# Patient Record
Sex: FEMALE | Race: ASIAN | NOT HISPANIC OR LATINO | URBAN - METROPOLITAN AREA
[De-identification: names, ages, dates, MRNs, and addresses within clinical notes are randomized per-mention and may not be internally consistent; named-entity substitution may affect disease eponyms.]

---

## 2017-06-12 VITALS
HEART RATE: 75 BPM | TEMPERATURE: 99 F | HEIGHT: 62 IN | DIASTOLIC BLOOD PRESSURE: 61 MMHG | OXYGEN SATURATION: 95 % | SYSTOLIC BLOOD PRESSURE: 119 MMHG | RESPIRATION RATE: 18 BRPM | WEIGHT: 158.73 LBS

## 2017-06-13 ENCOUNTER — INPATIENT (INPATIENT)
Facility: HOSPITAL | Age: 62
LOS: 1 days | Discharge: HOME | DRG: 30 | End: 2017-06-15
Attending: ORTHOPAEDIC SURGERY | Admitting: ORTHOPAEDIC SURGERY
Payer: COMMERCIAL

## 2017-06-13 DIAGNOSIS — Z98.891 HISTORY OF UTERINE SCAR FROM PREVIOUS SURGERY: Chronic | ICD-10-CM

## 2017-06-13 DIAGNOSIS — Z98.890 OTHER SPECIFIED POSTPROCEDURAL STATES: Chronic | ICD-10-CM

## 2017-06-13 DIAGNOSIS — M54.12 RADICULOPATHY, CERVICAL REGION: ICD-10-CM

## 2017-06-13 LAB
APTT BLD: 33.4 SEC — SIGNIFICANT CHANGE UP (ref 27.5–37.4)
INR BLD: 0.9 — SIGNIFICANT CHANGE UP (ref 0.88–1.16)
PROTHROM AB SERPL-ACNC: 10 SEC — SIGNIFICANT CHANGE UP (ref 9.8–12.7)

## 2017-06-13 RX ORDER — FLUCONAZOLE 150 MG/1
100 TABLET ORAL DAILY
Qty: 0 | Refills: 0 | Status: DISCONTINUED | OUTPATIENT
Start: 2017-06-13 | End: 2017-06-15

## 2017-06-13 RX ORDER — ONDANSETRON 8 MG/1
4 TABLET, FILM COATED ORAL EVERY 6 HOURS
Qty: 0 | Refills: 0 | Status: DISCONTINUED | OUTPATIENT
Start: 2017-06-13 | End: 2017-06-15

## 2017-06-13 RX ORDER — ACETAMINOPHEN 500 MG
650 TABLET ORAL EVERY 6 HOURS
Qty: 0 | Refills: 0 | Status: DISCONTINUED | OUTPATIENT
Start: 2017-06-13 | End: 2017-06-15

## 2017-06-13 RX ORDER — CEFAZOLIN SODIUM 1 G
2000 VIAL (EA) INJECTION EVERY 8 HOURS
Qty: 0 | Refills: 0 | Status: DISCONTINUED | OUTPATIENT
Start: 2017-06-13 | End: 2017-06-13

## 2017-06-13 RX ORDER — SIMVASTATIN 20 MG/1
1 TABLET, FILM COATED ORAL
Qty: 0 | Refills: 0 | COMMUNITY

## 2017-06-13 RX ORDER — SENNA PLUS 8.6 MG/1
2 TABLET ORAL AT BEDTIME
Qty: 0 | Refills: 0 | Status: DISCONTINUED | OUTPATIENT
Start: 2017-06-13 | End: 2017-06-15

## 2017-06-13 RX ORDER — SODIUM CHLORIDE 9 MG/ML
1000 INJECTION, SOLUTION INTRAVENOUS
Qty: 0 | Refills: 0 | Status: DISCONTINUED | OUTPATIENT
Start: 2017-06-13 | End: 2017-06-15

## 2017-06-13 RX ORDER — SIMVASTATIN 20 MG/1
10 TABLET, FILM COATED ORAL AT BEDTIME
Qty: 0 | Refills: 0 | Status: DISCONTINUED | OUTPATIENT
Start: 2017-06-13 | End: 2017-06-15

## 2017-06-13 RX ORDER — MORPHINE SULFATE 50 MG/1
4 CAPSULE, EXTENDED RELEASE ORAL
Qty: 0 | Refills: 0 | Status: DISCONTINUED | OUTPATIENT
Start: 2017-06-13 | End: 2017-06-14

## 2017-06-13 RX ORDER — MORPHINE SULFATE 50 MG/1
4 CAPSULE, EXTENDED RELEASE ORAL
Qty: 0 | Refills: 0 | Status: DISCONTINUED | OUTPATIENT
Start: 2017-06-13 | End: 2017-06-13

## 2017-06-13 RX ORDER — FLUCONAZOLE 150 MG/1
1 TABLET ORAL
Qty: 0 | Refills: 0 | COMMUNITY

## 2017-06-13 RX ORDER — CEFAZOLIN SODIUM 1 G
2000 VIAL (EA) INJECTION EVERY 8 HOURS
Qty: 0 | Refills: 0 | Status: COMPLETED | OUTPATIENT
Start: 2017-06-13 | End: 2017-06-14

## 2017-06-13 RX ORDER — DEXAMETHASONE 0.5 MG/5ML
10 ELIXIR ORAL EVERY 8 HOURS
Qty: 0 | Refills: 0 | Status: COMPLETED | OUTPATIENT
Start: 2017-06-13 | End: 2017-06-14

## 2017-06-13 RX ADMIN — MORPHINE SULFATE 4 MILLIGRAM(S): 50 CAPSULE, EXTENDED RELEASE ORAL at 18:22

## 2017-06-13 RX ADMIN — MORPHINE SULFATE 4 MILLIGRAM(S): 50 CAPSULE, EXTENDED RELEASE ORAL at 19:16

## 2017-06-13 RX ADMIN — Medication 102 MILLIGRAM(S): at 21:00

## 2017-06-13 RX ADMIN — MORPHINE SULFATE 4 MILLIGRAM(S): 50 CAPSULE, EXTENDED RELEASE ORAL at 19:35

## 2017-06-13 RX ADMIN — MORPHINE SULFATE 4 MILLIGRAM(S): 50 CAPSULE, EXTENDED RELEASE ORAL at 18:45

## 2017-06-13 RX ADMIN — SODIUM CHLORIDE 120 MILLILITER(S): 9 INJECTION, SOLUTION INTRAVENOUS at 20:28

## 2017-06-13 RX ADMIN — Medication 100 MILLIGRAM(S): at 21:34

## 2017-06-13 RX ADMIN — MORPHINE SULFATE 4 MILLIGRAM(S): 50 CAPSULE, EXTENDED RELEASE ORAL at 17:44

## 2017-06-13 NOTE — DISCHARGE NOTE ADULT - NS AS ACTIVITY OBS
Stairs allowed/Walking-Outdoors allowed/Do not drive or operate machinery/No Heavy lifting/straining/Walking-Indoors allowed

## 2017-06-13 NOTE — DISCHARGE NOTE ADULT - MEDICATION SUMMARY - MEDICATIONS TO TAKE
I will START or STAY ON the medications listed below when I get home from the hospital:    acetaminophen-oxycodone 325 mg-5 mg oral tablet  -- 1 tab(s) by mouth every 4 hours, As needed, Moderate Pain  -- Indication: For pain    acetaminophen-oxycodone 325 mg-5 mg oral tablet  -- 2 tab(s) by mouth every 4 hours, As Needed -Severe Pain  -- Indication: For pain    fluconazole 100 mg oral tablet  -- 1 tab(s) by mouth once a day  -- Indication: For Home med    Zocor 10 mg oral tablet  -- 1 tab(s) by mouth once a day (at bedtime)  -- Indication: For Home med I will START or STAY ON the medications listed below when I get home from the hospital:    acetaminophen-oxycodone 325 mg-5 mg oral tablet  -- 1 to 2 tab(s) by mouth every 4 hours, As Needed MDD:eight  -- Caution federal law prohibits the transfer of this drug to any person other  than the person for whom it was prescribed.  May cause drowsiness.  Alcohol may intensify this effect.  Use care when operating dangerous machinery.  This prescription cannot be refilled.  This product contains acetaminophen.  Do not use  with any other product containing acetaminophen to prevent possible liver damage.  Using more of this medication than prescribed may cause serious breathing problems.    -- Indication: For Pain    fluconazole 100 mg oral tablet  -- 1 tab(s) by mouth once a day  -- Indication: For Home med    Zocor 10 mg oral tablet  -- 1 tab(s) by mouth once a day (at bedtime)  -- Indication: For Home med

## 2017-06-13 NOTE — DISCHARGE NOTE ADULT - PATIENT PORTAL LINK FT
“You can access the [employer] Patient Portal, offered by [facility], by registering with the following website: [email or web address]”

## 2017-06-13 NOTE — H&P ADULT - NSHPPHYSICALEXAM_GEN_ALL_CORE
MSK:  +Decreased ROM secondary to pain, cervical spine    Remainder of physical exam per medical clearance note. MSK:  +Decreased ROM secondary to pain, cervical spine  Delt/bicep/tricep 5/5 strength bilateral UE. Wrist flex/ext intact. Motor intact to AIN/PIN/ulna.  strength intact bilateral UE.  Diminished sensation to light touch right hand. Sensation intact distal left upper extremity.  Radial pulses palpable, fingers warm and well perfused, cap refill brisk.    Remainder of physical exam per medical clearance note.

## 2017-06-13 NOTE — DISCHARGE NOTE ADULT - HOSPITAL COURSE
Admitted  Surgery -  Perioperative abx  Pain control  DVT ppx Admitted  Surgery - ACDF  Perioperative abx  Pain control  DVT ppx Admitted  Surgery - ACDF C4-7  Perioperative abx  Pain control  DVT ppx

## 2017-06-13 NOTE — H&P ADULT - PROBLEM SELECTOR PLAN 1
Admit to Orthopaedic Service.  Presents today for elective ACDF C4-C7  Pt medically stable and cleared for procedure today by Admit to Orthopaedic Service.  Presents today for elective ACDF C4-C7  Pt medically stable and cleared for procedure today by Dr. KIMBERLYN Todd

## 2017-06-13 NOTE — DISCHARGE NOTE ADULT - CARE PLAN
Principal Discharge DX:	Cervical radiculopathy  Goal:	improvement after surgery  Instructions for follow-up, activity and diet:	see below Principal Discharge DX:	Cervical radiculopathy  Goal:	improvement after surgery ACDF C4-7  Instructions for follow-up, activity and diet:	see below

## 2017-06-13 NOTE — DISCHARGE NOTE ADULT - ADDITIONAL INSTRUCTIONS
No strenuous activity, bending, twisting, heavy lifting, driving, tub bathing, or returning to work until cleared by MD.  You may sponge bathe.    Change dressing daily for 5 days from surgery.  Keep dressing clean and dry.  Remove dressing after post op day 5, then leave incision open to air.  You may now shower.    Follow up with Dr. Todd in his office in 2 weeks from surgery.  Any staples/sutures will be removed in 2 weeks.    If you don't have a bowel movement by post op day 3, then take Milk of Magnesia (over the counter).  If no bowel movement by at least post op day 5, then use a Dulcolax suppository (over the counter) and/or a Fleets enema--if still no bowel movement, call your MD.  Contact your doctor if you experience: fever greater than 101.5, chills, chest pain, difficulty breathing, bleeding, redness or heat around the incision.    Please follow up with your primary care provider. Restart aspirin in 5 days.  No strenuous activity, bending, twisting, heavy lifting, driving, tub bathing, or returning to work until cleared by MD.  You may sponge bathe.    Change dressing daily for 5 days from surgery.  Keep dressing clean and dry.  Remove dressing after post op day 5, then leave incision open to air.  You may then shower.    Follow up with Dr. Todd in his office in 2 weeks from surgery.  Any staples/sutures will be removed in 2 weeks.    If you don't have a bowel movement by post op day 3, then take Milk of Magnesia (over the counter).  If no bowel movement by at least post op day 5, then use a Dulcolax suppository (over the counter) and/or a Fleets enema--if still no bowel movement, call your MD.  Contact your doctor if you experience: fever greater than 101.5, chills, chest pain, difficulty breathing, bleeding, redness or heat around the incision.  Please follow up with your primary care provider.

## 2017-06-13 NOTE — DISCHARGE NOTE ADULT - CARE PROVIDER_API CALL
Gerson Todd (DO), Orthopaedic Surgery  [address] [place], [place] [address]  Phone: [phone]  Fax: [phone]
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
## 2017-06-13 NOTE — H&P ADULT - NSHPLABSRESULTS_GEN_ALL_CORE
Preop CBC, BMP, UA - WNL per medical clearance   PT/INR/PTT drawn DOS - pending  Preop EKG - WNL per medical clearance

## 2017-06-14 LAB
ANION GAP SERPL CALC-SCNC: 11 MMOL/L — SIGNIFICANT CHANGE UP (ref 5–17)
BASOPHILS NFR BLD AUTO: 0 % — SIGNIFICANT CHANGE UP (ref 0–2)
BUN SERPL-MCNC: 12 MG/DL — SIGNIFICANT CHANGE UP (ref 7–23)
CALCIUM SERPL-MCNC: 8.8 MG/DL — SIGNIFICANT CHANGE UP (ref 8.4–10.5)
CHLORIDE SERPL-SCNC: 101 MMOL/L — SIGNIFICANT CHANGE UP (ref 96–108)
CO2 SERPL-SCNC: 25 MMOL/L — SIGNIFICANT CHANGE UP (ref 22–31)
CREAT SERPL-MCNC: 0.5 MG/DL — SIGNIFICANT CHANGE UP (ref 0.5–1.3)
EOSINOPHIL NFR BLD AUTO: 0 % — SIGNIFICANT CHANGE UP (ref 0–6)
GLUCOSE SERPL-MCNC: 166 MG/DL — HIGH (ref 70–99)
HCT VFR BLD CALC: 37.1 % — SIGNIFICANT CHANGE UP (ref 34.5–45)
HGB BLD-MCNC: 12.4 G/DL — SIGNIFICANT CHANGE UP (ref 11.5–15.5)
LYMPHOCYTES # BLD AUTO: 10.7 % — LOW (ref 13–44)
MCHC RBC-ENTMCNC: 29.5 PG — SIGNIFICANT CHANGE UP (ref 27–34)
MCHC RBC-ENTMCNC: 33.4 G/DL — SIGNIFICANT CHANGE UP (ref 32–36)
MCV RBC AUTO: 88.1 FL — SIGNIFICANT CHANGE UP (ref 80–100)
MONOCYTES NFR BLD AUTO: 1.7 % — LOW (ref 2–14)
NEUTROPHILS NFR BLD AUTO: 87.6 % — HIGH (ref 43–77)
PLATELET # BLD AUTO: 253 K/UL — SIGNIFICANT CHANGE UP (ref 150–400)
POTASSIUM SERPL-MCNC: 3.8 MMOL/L — SIGNIFICANT CHANGE UP (ref 3.5–5.3)
POTASSIUM SERPL-SCNC: 3.8 MMOL/L — SIGNIFICANT CHANGE UP (ref 3.5–5.3)
RBC # BLD: 4.21 M/UL — SIGNIFICANT CHANGE UP (ref 3.8–5.2)
RBC # FLD: 12 % — SIGNIFICANT CHANGE UP (ref 10.3–16.9)
SODIUM SERPL-SCNC: 137 MMOL/L — SIGNIFICANT CHANGE UP (ref 135–145)
WBC # BLD: 10.3 K/UL — SIGNIFICANT CHANGE UP (ref 3.8–10.5)
WBC # FLD AUTO: 10.3 K/UL — SIGNIFICANT CHANGE UP (ref 3.8–10.5)

## 2017-06-14 PROCEDURE — 72040 X-RAY EXAM NECK SPINE 2-3 VW: CPT | Mod: 26

## 2017-06-14 RX ORDER — BENZOCAINE AND MENTHOL 5; 1 G/100ML; G/100ML
1 LIQUID ORAL
Qty: 0 | Refills: 0 | Status: DISCONTINUED | OUTPATIENT
Start: 2017-06-14 | End: 2017-06-15

## 2017-06-14 RX ORDER — OXYCODONE HYDROCHLORIDE 5 MG/1
1 TABLET ORAL
Qty: 42 | Refills: 0 | OUTPATIENT
Start: 2017-06-14 | End: 2017-06-21

## 2017-06-14 RX ORDER — METOCLOPRAMIDE HCL 10 MG
10 TABLET ORAL EVERY 8 HOURS
Qty: 0 | Refills: 0 | Status: DISCONTINUED | OUTPATIENT
Start: 2017-06-14 | End: 2017-06-15

## 2017-06-14 RX ORDER — MORPHINE SULFATE 50 MG/1
4 CAPSULE, EXTENDED RELEASE ORAL
Qty: 0 | Refills: 0 | Status: DISCONTINUED | OUTPATIENT
Start: 2017-06-14 | End: 2017-06-15

## 2017-06-14 RX ORDER — SCOPALAMINE 1 MG/3D
1.5 PATCH, EXTENDED RELEASE TRANSDERMAL
Qty: 0 | Refills: 0 | Status: DISCONTINUED | OUTPATIENT
Start: 2017-06-14 | End: 2017-06-15

## 2017-06-14 RX ADMIN — ONDANSETRON 4 MILLIGRAM(S): 8 TABLET, FILM COATED ORAL at 04:26

## 2017-06-14 RX ADMIN — Medication 10 MILLIGRAM(S): at 14:31

## 2017-06-14 RX ADMIN — Medication 100 MILLIGRAM(S): at 06:01

## 2017-06-14 RX ADMIN — MORPHINE SULFATE 4 MILLIGRAM(S): 50 CAPSULE, EXTENDED RELEASE ORAL at 06:22

## 2017-06-14 RX ADMIN — MORPHINE SULFATE 4 MILLIGRAM(S): 50 CAPSULE, EXTENDED RELEASE ORAL at 07:15

## 2017-06-14 RX ADMIN — MORPHINE SULFATE 4 MILLIGRAM(S): 50 CAPSULE, EXTENDED RELEASE ORAL at 14:57

## 2017-06-14 RX ADMIN — SCOPALAMINE 1.5 MILLIGRAM(S): 1 PATCH, EXTENDED RELEASE TRANSDERMAL at 14:35

## 2017-06-14 RX ADMIN — SENNA PLUS 2 TABLET(S): 8.6 TABLET ORAL at 20:54

## 2017-06-14 RX ADMIN — MORPHINE SULFATE 4 MILLIGRAM(S): 50 CAPSULE, EXTENDED RELEASE ORAL at 14:31

## 2017-06-14 RX ADMIN — ONDANSETRON 4 MILLIGRAM(S): 8 TABLET, FILM COATED ORAL at 11:07

## 2017-06-14 RX ADMIN — SODIUM CHLORIDE 120 MILLILITER(S): 9 INJECTION, SOLUTION INTRAVENOUS at 07:33

## 2017-06-14 RX ADMIN — Medication 30 MILLILITER(S): at 06:16

## 2017-06-14 RX ADMIN — SIMVASTATIN 10 MILLIGRAM(S): 20 TABLET, FILM COATED ORAL at 20:53

## 2017-06-14 RX ADMIN — Medication 102 MILLIGRAM(S): at 06:01

## 2017-06-14 NOTE — CONSULT NOTE ADULT - SUBJECTIVE AND OBJECTIVE BOX
Pain Management Consult Note - [facility] [phone]    Chief Complaint:  Neck pain    HPI:  62 y/o female with neck pain S/P ACDF C4-C7 yesterday.  Pt. states as an outpatient, she was not taking any pain medication.  Used IV morphine for pain management overnight.  States it was helpful.      Pain is ___ sharp ____dull ___burning _x__achy ___ Intensity: ____ mild ___mod ___severe     Location _x___surgical site _x___cervical _____lumbar ____abd ____upper ext____lower ext    Worse with _x___activity ___x_movement _____physical therapy___ Rest    Improved with ___x_medication ___x_rest ____physical therapy    ROS: Const:  _-__febrile   Eyes:___ENT:__-_CV: _-__chest pain  Resp: __-__sob  GI:_-__nausea _-__vomiting _-__abd pain ___npo ___clears __full diet __bm  :_-__ Musk: _+__pain ___spasm  Skin:___ Neuro:  __-_pjawkilw_-__dmjukkzxz__-_ numbness _-__weakness ___paresth  Psych:__anxiety  Endo:_-__ Heme:_-__Allergy:__NKDA_______, ___all others reviewed and negative    PAST MEDICAL & SURGICAL HISTORY:  HLD (hyperlipidemia)  History of lumbar discectomy  History of arthroscopy of left shoulder  History of  section      SH: _denies__Tobacco   __occasional_Alcohol                          FH:FAMILY HISTORY:      lactated ringers. 1000milliLiter(s) IV Continuous <Continuous>  acetaminophen   Tablet 650milliGRAM(s) Oral every 6 hours PRN  acetaminophen   Tablet. 650milliGRAM(s) Oral every 6 hours PRN  oxyCODONE  5 mG/acetaminophen 325 mG 1Tablet(s) Oral every 4 hours PRN  oxyCODONE  5 mG/acetaminophen 325 mG 2Tablet(s) Oral every 6 hours PRN  morphine  - Injectable 4milliGRAM(s) IntraMuscular every 3 hours PRN  ondansetron Injectable 4milliGRAM(s) IV Push every 6 hours PRN  senna 2Tablet(s) Oral at bedtime  multivitamin 1Tablet(s) Oral daily  fluconAZOLE   Tablet 100milliGRAM(s) Oral daily  simvastatin 10milliGRAM(s) Oral at bedtime  aluminum hydroxide/magnesium hydroxide/simethicone Suspension 30milliLiter(s) Oral every 4 hours PRN  benzocaine 15 mG/menthol 3.6 mG Lozenge 1Lozenge Oral every 2 hours PRN      T(C): 36.2, Max: 37.2 ( @ 20:36)  HR: 86 (80 - 92)  BP: 139/64 (123/67 - 145/82)  RR: 17 (12 - 17)  SpO2: 97% (95% - 99%)  Wt(kg): --    T(C): 36.2, Max: 37.2 ( @ 20:36)  HR: 86 (80 - 92)  BP: 139/64 (123/67 - 145/82)  RR: 17 (12 - 17)  SpO2: 97% (95% - 99%)  Wt(kg): --    T(C): 36.2, Max: 37.2 ( @ 20:36)  HR: 86 (80 - 92)  BP: 139/64 (123/67 - 145/82)  RR: 17 (12 - 17)  SpO2: 97% (95% - 99%)  Wt(kg): --    PHYSICAL EXAM:  Gen Appearance: _x__no acute distress _x__appropriate        Neuro: _x__SILT feet__x__ EOM Intact Psych: AAOX_3_, __x_mood/affect appropriate        Eyes: _x__conjunctiva WNL  __x___ Pupils equal and round        ENT: x__ears and nose atraumatic__x_ Hearing grossly intact        Neck: ___trachea midline, no visible masses ___thyroid without palpable mass    Resp: _x__Nml WOB____No tactile fremitus ___clear to auscultation    Cardio: ___extremities free from edema ____pedal pulses palpable    GI/Abdomen: _x__soft __x___ Nontender______Nondistended_____HSM    Lymphatic: ___no palpable nodes in neck  ___no palpable nodes calves and feet    Skin/Wound: ___Incision, ___Dressing c/d/i,   ____surrounding tissues soft,  _x__drain/chest tube present____    Muscular: EHL __5_/5  Gastrocnemius___/5    _x__absent clubbing/cyanosis      ASSESSMENT: This is a 61y old Female with a history of   HLD (hyperlipidemia)  Cervical radiculopathy: Cervical radiculopathy  History of lumbar discectomy  History of arthroscopy of left shoulder  History of  section  and neck pain S/P ACDF C4-C7 yesterday and is doing well.    Recommended Treatment PLAN:  1.  Continue percocet 5/325 1-2 tabs po q4h prn  2.  Continue morphine 4 mg IV q3h prn breakthrough pain

## 2017-06-15 VITALS
OXYGEN SATURATION: 96 % | HEART RATE: 76 BPM | SYSTOLIC BLOOD PRESSURE: 104 MMHG | TEMPERATURE: 98 F | DIASTOLIC BLOOD PRESSURE: 67 MMHG | RESPIRATION RATE: 15 BRPM

## 2017-06-15 LAB
ANION GAP SERPL CALC-SCNC: 12 MMOL/L — SIGNIFICANT CHANGE UP (ref 5–17)
BASOPHILS NFR BLD AUTO: 0.1 % — SIGNIFICANT CHANGE UP (ref 0–2)
BUN SERPL-MCNC: 15 MG/DL — SIGNIFICANT CHANGE UP (ref 7–23)
CALCIUM SERPL-MCNC: 9.1 MG/DL — SIGNIFICANT CHANGE UP (ref 8.4–10.5)
CHLORIDE SERPL-SCNC: 103 MMOL/L — SIGNIFICANT CHANGE UP (ref 96–108)
CO2 SERPL-SCNC: 27 MMOL/L — SIGNIFICANT CHANGE UP (ref 22–31)
CREAT SERPL-MCNC: 0.6 MG/DL — SIGNIFICANT CHANGE UP (ref 0.5–1.3)
GLUCOSE SERPL-MCNC: 95 MG/DL — SIGNIFICANT CHANGE UP (ref 70–99)
HCT VFR BLD CALC: 38 % — SIGNIFICANT CHANGE UP (ref 34.5–45)
HGB BLD-MCNC: 12.3 G/DL — SIGNIFICANT CHANGE UP (ref 11.5–15.5)
LYMPHOCYTES # BLD AUTO: 19.8 % — SIGNIFICANT CHANGE UP (ref 13–44)
MCHC RBC-ENTMCNC: 29.4 PG — SIGNIFICANT CHANGE UP (ref 27–34)
MCHC RBC-ENTMCNC: 32.4 G/DL — SIGNIFICANT CHANGE UP (ref 32–36)
MCV RBC AUTO: 90.7 FL — SIGNIFICANT CHANGE UP (ref 80–100)
MONOCYTES NFR BLD AUTO: 7 % — SIGNIFICANT CHANGE UP (ref 2–14)
NEUTROPHILS NFR BLD AUTO: 73.1 % — SIGNIFICANT CHANGE UP (ref 43–77)
PLATELET # BLD AUTO: 289 K/UL — SIGNIFICANT CHANGE UP (ref 150–400)
POTASSIUM SERPL-MCNC: 3.8 MMOL/L — SIGNIFICANT CHANGE UP (ref 3.5–5.3)
POTASSIUM SERPL-SCNC: 3.8 MMOL/L — SIGNIFICANT CHANGE UP (ref 3.5–5.3)
RBC # BLD: 4.19 M/UL — SIGNIFICANT CHANGE UP (ref 3.8–5.2)
RBC # FLD: 12.6 % — SIGNIFICANT CHANGE UP (ref 10.3–16.9)
SODIUM SERPL-SCNC: 142 MMOL/L — SIGNIFICANT CHANGE UP (ref 135–145)
WBC # BLD: 12.2 K/UL — HIGH (ref 3.8–10.5)
WBC # FLD AUTO: 12.2 K/UL — HIGH (ref 3.8–10.5)

## 2017-06-15 PROCEDURE — 95940 IONM IN OPERATNG ROOM 15 MIN: CPT

## 2017-06-15 PROCEDURE — 76000 FLUOROSCOPY <1 HR PHYS/QHP: CPT

## 2017-06-15 PROCEDURE — 86901 BLOOD TYPING SEROLOGIC RH(D): CPT

## 2017-06-15 PROCEDURE — 85025 COMPLETE CBC W/AUTO DIFF WBC: CPT

## 2017-06-15 PROCEDURE — 88304 TISSUE EXAM BY PATHOLOGIST: CPT

## 2017-06-15 PROCEDURE — 36415 COLL VENOUS BLD VENIPUNCTURE: CPT

## 2017-06-15 PROCEDURE — 72040 X-RAY EXAM NECK SPINE 2-3 VW: CPT

## 2017-06-15 PROCEDURE — C1889: CPT

## 2017-06-15 PROCEDURE — C1713: CPT

## 2017-06-15 PROCEDURE — 80048 BASIC METABOLIC PNL TOTAL CA: CPT

## 2017-06-15 PROCEDURE — 85610 PROTHROMBIN TIME: CPT

## 2017-06-15 PROCEDURE — 85730 THROMBOPLASTIN TIME PARTIAL: CPT

## 2017-06-15 PROCEDURE — 86900 BLOOD TYPING SEROLOGIC ABO: CPT

## 2017-06-15 PROCEDURE — 86850 RBC ANTIBODY SCREEN: CPT

## 2017-06-15 RX ORDER — ASPIRIN/CALCIUM CARB/MAGNESIUM 324 MG
1 TABLET ORAL
Qty: 0 | Refills: 0 | COMMUNITY

## 2017-06-15 RX ORDER — OXYCODONE HYDROCHLORIDE 5 MG/1
1 TABLET ORAL
Qty: 0 | Refills: 0 | COMMUNITY
Start: 2017-06-15

## 2017-06-15 RX ORDER — OXYCODONE HYDROCHLORIDE 5 MG/1
1 TABLET ORAL
Qty: 60 | Refills: 0 | OUTPATIENT
Start: 2017-06-15

## 2017-06-15 RX ORDER — OXYCODONE HYDROCHLORIDE 5 MG/1
2 TABLET ORAL
Qty: 0 | Refills: 0 | COMMUNITY
Start: 2017-06-15

## 2017-06-15 RX ADMIN — BENZOCAINE AND MENTHOL 1 LOZENGE: 5; 1 LIQUID ORAL at 12:59

## 2017-06-15 RX ADMIN — Medication 1 TABLET(S): at 12:59

## 2017-06-15 RX ADMIN — BENZOCAINE AND MENTHOL 1 LOZENGE: 5; 1 LIQUID ORAL at 15:54

## 2017-06-15 NOTE — PROGRESS NOTE ADULT - SUBJECTIVE AND OBJECTIVE BOX
Pod 2 ACdf C4-7    Patient is doing well. No complaints. Patient is wearing a cervical brace, no hoarseness, mild difficult swallowing    Cervical spine-skin is clean dry and intact.  There is mild pain to palpation at the paraspinal musculature of the cervical spine.  Patient is neurovascular intact.  Power is 5 out of 5 on upper extremities.  Sensation is normal.  Reflexes are brisk and symmetrical.   Range of motion cervical spine within normal limits    Patient have walked    Cervical spine AP and lateral shows no gross fractures or dislocations.  Implants are in a good position  Plan: Pain control  Abx  PT  Dc to home
ORTHO NOTE    [X ] Pt seen/examined.  [ ] Pt without any complaints/in NAD.    [X ] Pt complains of:  incisional pain - meds help. c/o nausea.  has been oob.      ROS: [ ] Fever  [ ] Chills  [ ] CP [ ] SOB [ ] Dysnea  [ ] Palpitations [ ] Cough [ ] N/V/C/D [ ] Paresthia [ ] Other     [X ] ROS  otherwise negative    .    PHYSICAL EXAM:    Vital Signs Last 24 Hrs  T(C): 36.4, Max: 37.2 (06-13 @ 20:36)  T(F): 97.5, Max: 99 (06-13 @ 20:36)  HR: 78 (78 - 92)  BP: 134/62 (123/67 - 145/82)  BP(mean): --  RR: 15 (12 - 17)  SpO2: 98% (95% - 99%)    I&O's Detail  I & Os for 24h ending 14 Jun 2017 07:00  =============================================  IN:    lactated ringers.: 1080 ml    Oral Fluid: 100 ml    Total IN: 1180 ml  ---------------------------------------------  OUT:    Voided: 500 ml    Accordian: 25 ml    Total OUT: 525 ml  ---------------------------------------------  Total NET: 655 ml    I & Os for current day (as of 14 Jun 2017 14:25)  =============================================  IN:    lactated ringers.: 240 ml    Total IN: 240 ml  ---------------------------------------------  OUT:    Voided: 425 ml    Accordian: 25 ml    Total OUT: 450 ml  ---------------------------------------------  Total NET: -210 ml       CAPILLARY BLOOD GLUCOSE                  Neuro:  NAD A and O x 3     Neck dsg c/d/i    Lungs:    CV:    ABD:     Ext:  UE strength 5/5 silt b/l    LABS                        12.4   10.3  )-----------( 253      ( 14 Jun 2017 07:09 )             37.1                              PT/INR - ( 13 Jun 2017 13:05 )   PT: 10.0 sec;   INR: 0.90          PTT - ( 13 Jun 2017 13:05 )  PTT:33.4 sec  06-14    137  |  101  |  12  ----------------------------<  166<H>  3.8   |  25  |  0.50    Ca    8.8      14 Jun 2017 07:09        [ ] Other Labs  [ ] None ordered            Please check or Hoh when present:  •  Heart Failure:    [ ] Acute        [ ]  Acute on Chronic        [ ] Chronic         [ ] Diastolic     [ ]  Combined    •  MISTI:     [ ] ATN        [ ]  Renal medullary necrosis       [ ]  Renal cortical necrosis                  [ ] Other pathological Lesion:  •  CKD:  [ ] Stage I   [ ] Stage II  [ ] Stage III    [ ]Stage IV   [ ]  CKD V   [ ]  Other/Unspecified:    •  Abdominal Nutritional Status:   [ ] Malnutrition-See Nutrition note    [ ] Cachexia   [ ]  Other        [ ] Supplement ordered:            [ ] Morbid Obesity: BMI >=40         ASSESSMENT/PLAN:      STATUS POST:   ACDF C4 to C7 pod 1    CONTINUE:          [ ] PT wbat    [ ] DVT PPX- scd    [ ] Pain Mgt service is following    [ ] Dispo plan-  home tomorrow    Cont HV.  Postop XRs done.   Antiemetics prn.  IS use.  Bowel regimen.
Orthopaedics Post Op Check    Procedure: ACDF C4-C7 x1 drain  Surgeon: Perez    Pt comfortable, without complaints  Denies CP, SOB, N/V, numbness/tingling     Vital Signs Last 24 Hrs  T(C): --  T(F): --  HR: 82 (80 - 86)  BP: 128/81 (128/81 - 145/82)  BP(mean): --  RR: 14 (12 - 14)  SpO2: 97% (97% - 99%)  AVSS, NAD    Stable and comfortable in bed. Family bedside.   Dressing C/D/I x 1 drain  General: Pt Alert and oriented   Pulses: wwp  Sensation: intact  Motor: able to move upper (WF/WE/Hand ) and lower extremity (GS/TA 5/5)      A/P: 61yFemale POD#0 s/p above  - Stable  - Pain Control  - DVT ppx: scd  - Post op abx: ancef  - PT, WBS: wbat  - F/U AM Labs
Pain Management Progress Note - [facility] [phone]    HPI:  Pt. complains of posterior neck pain.  Used pain medications minimally over the last 24 hours.              Pertinent PMH: Pain at: ___Back _x__Neck___Knee ___Hip ___Shoulder ___ Opioid tolerance    Pain is ___ sharp ____dull ___burning _x__achy ___ Intensity: ____ mild ____mod ____severe     Location _x____surgical site __x___cervical _____lumbar ____abd _____upper ext____lower ext    Worse with __x__activity _x___movement _____physical therapy___ Rest    Improved with _x___medication __x__rest ____physical therapy    lactated ringers.  acetaminophen   Tablet  acetaminophen   Tablet.  oxyCODONE  5 mG/acetaminophen 325 mG  oxyCODONE  5 mG/acetaminophen 325 mG  ondansetron Injectable  senna  multivitamin  fluconAZOLE   Tablet  simvastatin  aluminum hydroxide/magnesium hydroxide/simethicone Suspension  benzocaine 15 mG/menthol 3.6 mG Lozenge  metoclopramide Injectable  scopolamine   Patch  morphine  - Injectable      ROS: Const:  ___febrile   Eyes:___ENT:___CV: ___chest pain  Resp: ____sob  GI:_-__nausea _-__vomiting _-___abd pain ___npo ___clears _+__full diet __bm  :___ Musk: _+__pain ___spasm  Skin:___ Neuro:  __-_naufkhjh_-__yfcqrprxb___-_ numbness _-__weakness ___paresthesia  Psych:___anxiety  Endo:___ Heme:___Allergy:___      06-15 @ [id number]:60253 mL/min/1.73M2          Hemoglobin: 12.3 g/dL (06-15 @ 07:40)  Hemoglobin: 12.4 g/dL ( @ 07:09)        T(C): 36.2, Max: 36.6 ( @ 17:42)  HR: 63 (63 - 73)  BP: 15/75 (15/75 - 129/71)  RR: 16 (16 - 17)  SpO2: 95% (92% - 96%)  Wt(kg): --     PHYSICAL EXAM:  Gen Appearance: _x__no acute distress __x_appropriate         Neuro: _x__SILT hands__x__ EOM Intact Psych: AAOX_3_, _x__mood/affect appropriate        Eyes: _x__conjunctiva WNL  __x___ Pupils equal and round        ENT: _x__ears and nose atraumatic_x__ Hearing grossly intact        Neck: ___trachea midline, no visible masses ___thyroid without palpable mass Cervical collar in place.    Resp: _x__Nml WOB____No tactile fremitus ___clear to auscultation    Cardio: ___extremities free from edema ____pedal pulses palpable    GI/Abdomen: _x__soft ___x__ Nontender______Nondistended_____HSM    Lymphatic: ___no palpable nodes in neck  ___no palpable nodes calves and feet    Skin/Wound: ___Incision, ___Dressing c/d/i,   ____surrounding tissues soft,  ___drain/chest tube present____    Muscular: EHL ___/5  Gastrocnemius___/5    _x__absent clubbing/cyanosis     hand  equal and strong    ASSESSMENT:  This is a 61y old Female with a history of:  HLD (hyperlipidemia)  Cervical radiculopathy  History of lumbar discectomy  History of arthroscopy of left shoulder  History of  section  and neck pain S/P ACDF C4-C7 and is doing well.      Recommended Treatment PLAN:  1.  Percocet 5/325 1-2 tab po q4h prn  2.  Morphine 4 mg IV q3h prn
Patient seen and examined at bedside.     SUBJECTIVE: No acute events overnight.  Pain tolerable.    Denies Chest Pain/SOB.    Denies Headache.    Denies Nausea/vomiting.      OBJECTIVE:   T(C): 36.2, Max: 37.2 (06-13 @ 20:36)  T(F): 97.1, Max: 99 (06-13 @ 20:36)  HR: 86 (80 - 92)  BP: 139/64 (123/67 - 145/82)  RR:  (12 - 17)  SpO2:  (95% - 99%)  Wt(kg): --    NAD, non labored respirations  Affected extremity:          Dressing: clean/dry/intact          Drains: none         Sensation: [x ] intact to light touch  [ ] decreased                              Vascular: [x ] warm well perfused; capillary refill <3 seconds          Motor exam: [x ]         [x ] Upper extremity                   Berny (c5)   Bi(c5/6)   WE(c6)   EE(c7)    (c8)                                                R           5              5            5             5             5                                               L            5              5            5             5            5         [ ] Lower extremity                   IP(L2)     Q(L3)     TA(L4)     EHL(L5)     GS(s1)                                                 R          5           5          5            5              5                                               L           5           5         5             5              5                                     12.4   10.3  )-------------------( 253      ( 06-14 @ 07:09 )                 37.1     I&O's Detail  I & Os for 24h ending 14 Jun 2017 07:00  =============================================  IN:    lactated ringers.: 1080 ml    Oral Fluid: 100 ml    Total IN: 1180 ml  ---------------------------------------------  OUT:    Voided: 500 ml    Accordian: 25 ml    Total OUT: 525 ml  ---------------------------------------------  Total NET: 655 ml    I & Os for current day (as of 14 Jun 2017 08:27)  =============================================  IN:    Total IN: 0 ml  ---------------------------------------------  OUT:    Voided: 300 ml    Total OUT: 300 ml  ---------------------------------------------  Total NET: -300 ml      A/P :  61y Female s/p C4-7 ACDF       -    Pain control + bowel regimen  -    DVT ppx: SCDs    -    Periop abx    -    ADAT  -    Check AM labs  -    Weight bearing status:   WBAT        -    Physical Therapy  -    Resume home meds  -    Dispo planning
Patient seen and examined at bedside.     SUBJECTIVE: No acute events overnight.  Pain tolerable.    Denies Chest Pain/SOB.    Denies Headache.    Denies Nausea/vomiting.      OBJECTIVE:   Vital Signs Last 24 Hrs  T(C): 36.5, Max: 36.6 (06-14 @ 17:42)  T(F): 97.7, Max: 97.9 (06-14 @ 17:42)  HR: 64 (64 - 78)  BP: 116/70 (105/64 - 134/62)  BP(mean): --  RR: 16 (15 - 17)  SpO2: 94% (92% - 98%)    NAD, non labored respirations  Affected extremity:          Dressing: clean/dry/intact          Drains: none         Sensation: [x ] intact to light touch  [ ] decreased                              Vascular: [x ] warm well perfused; capillary refill <3 seconds          Motor exam: [x ]         [x ] Upper extremity                   Berny (c5)   Bi(c5/6)   WE(c6)   EE(c7)    (c8)                                                R           5              5            5             5             5                                               L            5              5            5             5            5         [ ] Lower extremity                   IP(L2)     Q(L3)     TA(L4)     EHL(L5)     GS(s1)                                                 R          5           5          5            5              5                                               L           5           5         5             5              5                                     12.4   10.3  )-------------------( 253      ( 06-14 @ 07:09 )                 37.1     I&O's Detail  I & Os for 24h ending 14 Jun 2017 07:00  =============================================  IN:    lactated ringers.: 1080 ml    Oral Fluid: 100 ml    Total IN: 1180 ml  ---------------------------------------------  OUT:    Voided: 500 ml    Accordian: 25 ml    Total OUT: 525 ml  ---------------------------------------------  Total NET: 655 ml    I & Os for current day (as of 14 Jun 2017 08:27)  =============================================  IN:    Total IN: 0 ml  ---------------------------------------------  OUT:    Voided: 300 ml    Total OUT: 300 ml  ---------------------------------------------  Total NET: -300 ml      A/P :  61y Female s/p C4-7 ACDF  6/13/17     -    Pain control + bowel regimen  -    DVT ppx: SCDs    -    Periop abx    -    ADAT  -    Check AM labs  -    Weight bearing status:   WBAT        -    Physical Therapy  -    Resume home meds  -    Dispo planning

## 2017-06-21 DIAGNOSIS — M48.8X2 OTHER SPECIFIED SPONDYLOPATHIES, CERVICAL REGION: ICD-10-CM

## 2017-06-21 DIAGNOSIS — M54.2 CERVICALGIA: ICD-10-CM

## 2017-06-21 DIAGNOSIS — E78.5 HYPERLIPIDEMIA, UNSPECIFIED: ICD-10-CM

## 2017-06-21 DIAGNOSIS — M54.12 RADICULOPATHY, CERVICAL REGION: ICD-10-CM

## 2017-06-22 LAB — SURGICAL PATHOLOGY STUDY: SIGNIFICANT CHANGE UP

## 2020-11-19 NOTE — PATIENT PROFILE ADULT. - BRADEN SCORE (IF 18 OR LESS ACTIVATE SKIN INJURY RISK INCREASED GUIDELINE), MLM
[Normal Breath Sounds] : Normal breath sounds [2+] : left 2+ [Alert] : alert [Oriented to Person] : oriented to person [Oriented to Place] : oriented to place [Calm] : calm [de-identified] : NAD [de-identified] : NCAT [de-identified] : supple [FreeTextEntry1] : left arm radiocephalic AVF with strong palpable thrill [de-identified] : right BKA stump fully healed, no ulcerations 23

## 2021-04-02 PROBLEM — E78.5 HYPERLIPIDEMIA, UNSPECIFIED: Chronic | Status: ACTIVE | Noted: 2017-06-12

## 2021-04-09 PROBLEM — Z00.00 ENCOUNTER FOR PREVENTIVE HEALTH EXAMINATION: Status: ACTIVE | Noted: 2021-04-09

## 2021-05-12 ENCOUNTER — APPOINTMENT (OUTPATIENT)
Dept: UROLOGY | Facility: CLINIC | Age: 66
End: 2021-05-12
Payer: MEDICAID

## 2021-05-12 VITALS
HEIGHT: 62 IN | SYSTOLIC BLOOD PRESSURE: 103 MMHG | OXYGEN SATURATION: 96 % | DIASTOLIC BLOOD PRESSURE: 70 MMHG | WEIGHT: 142 LBS | TEMPERATURE: 97.9 F | BODY MASS INDEX: 26.13 KG/M2 | HEART RATE: 79 BPM

## 2021-05-12 DIAGNOSIS — Z63.5 DISRUPTION OF FAMILY BY SEPARATION AND DIVORCE: ICD-10-CM

## 2021-05-12 DIAGNOSIS — Z78.9 OTHER SPECIFIED HEALTH STATUS: ICD-10-CM

## 2021-05-12 DIAGNOSIS — R39.9 UNSPECIFIED SYMPTOMS AND SIGNS INVOLVING THE GENITOURINARY SYSTEM: ICD-10-CM

## 2021-05-12 DIAGNOSIS — Z86.39 PERSONAL HISTORY OF OTHER ENDOCRINE, NUTRITIONAL AND METABOLIC DISEASE: ICD-10-CM

## 2021-05-12 PROCEDURE — 99072 ADDL SUPL MATRL&STAF TM PHE: CPT

## 2021-05-12 PROCEDURE — 99203 OFFICE O/P NEW LOW 30 MIN: CPT

## 2021-05-12 RX ORDER — SIMVASTATIN 10 MG/1
10 TABLET, FILM COATED ORAL
Refills: 0 | Status: ACTIVE | COMMUNITY
Start: 2021-05-12

## 2021-05-12 RX ORDER — VENLAFAXINE HYDROCHLORIDE 225 MG/1
225 TABLET, EXTENDED RELEASE ORAL
Refills: 0 | Status: ACTIVE | COMMUNITY
Start: 2021-05-12

## 2021-05-12 SDOH — SOCIAL STABILITY - SOCIAL INSECURITY: DISRUPTION OF FAMILY BY SEPARATION AND DIVORCE: Z63.5

## 2021-05-16 PROBLEM — Z86.39 HISTORY OF HIGH CHOLESTEROL: Status: RESOLVED | Noted: 2021-05-12 | Resolved: 2021-05-16

## 2021-05-16 NOTE — ASSESSMENT
[FreeTextEntry1] : 66 yo F with LUTS\par \par - PVR = 0ml\par - UA and culture\par - Discussed possible etiologies for LUTS. Discussed ways to manage them including behavioral modifications such as adequate hydration, controlling constipation, restricting fluids in the evening. \par - Reviewed option of anticholinergics. For now, will try behavioral modifications

## 2021-05-16 NOTE — HISTORY OF PRESENT ILLNESS
[FreeTextEntry1] : 66 yo Syriac speaking F presents with 6 month history of LUTS\par Sensation of incomplete bladder emptying, urgency\par Voiding every 1-2 hours, nocturia 3-4/night\par Sometimes strong smelling urine\par no gross hematuria\par PCP checked UA which was normal\par Drinks 3 cups of water, 2 cups of coffee daily\par 1 child, \par sometimes constipation\par not sexually active\par LMP = 18 yrs ago\par Currently on "detox diet" which requires more water and noticed improvement in symptoms this week

## 2021-05-16 NOTE — PHYSICAL EXAM
[General Appearance - Well Developed] : well developed [General Appearance - Well Nourished] : well nourished [Normal Appearance] : normal appearance [Well Groomed] : well groomed [General Appearance - In No Acute Distress] : no acute distress [Edema] : no peripheral edema [Respiration, Rhythm And Depth] : normal respiratory rhythm and effort [Exaggerated Use Of Accessory Muscles For Inspiration] : no accessory muscle use [Abdomen Tenderness] : non-tender [Abdomen Soft] : soft [Costovertebral Angle Tenderness] : no ~M costovertebral angle tenderness [Urethral Meatus] : normal urethra [Urinary Bladder Findings] : the bladder was normal on palpation [External Female Genitalia] : normal external genitalia [Normal Station and Gait] : the gait and station were normal for the patient's age [] : no rash [No Focal Deficits] : no focal deficits [Oriented To Time, Place, And Person] : oriented to person, place, and time [Affect] : the affect was normal [Mood] : the mood was normal [Not Anxious] : not anxious [No Palpable Adenopathy] : no palpable adenopathy

## 2021-05-16 NOTE — REVIEW OF SYSTEMS
[Feeling Tired] : feeling tired [Eyesight Problems] : eyesight problems [Dry Eyes] : dryness of the eyes [Shortness Of Breath] : shortness of breath [Constipation] : constipation [Painful Baring] : painful Baring [Loss of interest] : loss of interest in sexual activity [Wake up at night to urinate  How many times?  ___] : wakes up to urinate [unfilled] times during the night [Strong urge to urinate] : strong urge to urinate [Bladder pressure] : experiences bladder pressure [Strain or push to urinate] : strain or push to urinate [Wait a long time to urinate] : waits a long time to urinate [Slow urine stream] : slow urine stream [Interrupted urine stream] : interrupted urine stream [Bladder fullness after urinating] : bladder fullness after urinating [Leakage of urine with straining, coughing, laughing] : leakage of urine with straining, coughing, laughing [Joint Pain] : joint pain [Joint Swelling] : joint swelling [Limb Swelling] : limb swelling [Limb Weakness] : limb weakness [Difficulty Walking] : difficulty walking [Feelings Of Weakness] : feelings of weakness [Easy Bruising] : a tendency for easy bruising [Negative] : Psychiatric [[email or web address]] : Frequent urination 10x daily

## 2021-05-17 LAB
APPEARANCE: CLEAR
BACTERIA UR CULT: NORMAL
BACTERIA: NEGATIVE
BILIRUBIN URINE: NEGATIVE
BLOOD URINE: NEGATIVE
COLOR: NORMAL
GLUCOSE QUALITATIVE U: NEGATIVE
HYALINE CASTS: 0 /LPF
KETONES URINE: NEGATIVE
LEUKOCYTE ESTERASE URINE: NEGATIVE
MICROSCOPIC-UA: NORMAL
NITRITE URINE: NEGATIVE
PH URINE: 6.5
PROTEIN URINE: NEGATIVE
RED BLOOD CELLS URINE: 1 /HPF
SPECIFIC GRAVITY URINE: 1.01
SQUAMOUS EPITHELIAL CELLS: 0 /HPF
UROBILINOGEN URINE: NORMAL
WHITE BLOOD CELLS URINE: 2 /HPF
